# Patient Record
Sex: FEMALE | Race: WHITE
[De-identification: names, ages, dates, MRNs, and addresses within clinical notes are randomized per-mention and may not be internally consistent; named-entity substitution may affect disease eponyms.]

---

## 2018-04-15 ENCOUNTER — HOSPITAL ENCOUNTER (EMERGENCY)
Dept: HOSPITAL 41 - JD.ED | Age: 23
Discharge: HOME | End: 2018-04-15
Payer: COMMERCIAL

## 2018-04-15 DIAGNOSIS — Z79.899: ICD-10-CM

## 2018-04-15 DIAGNOSIS — F17.210: ICD-10-CM

## 2018-04-15 DIAGNOSIS — G43.909: Primary | ICD-10-CM

## 2018-04-15 PROCEDURE — 96361 HYDRATE IV INFUSION ADD-ON: CPT

## 2018-04-15 PROCEDURE — 36415 COLL VENOUS BLD VENIPUNCTURE: CPT

## 2018-04-15 PROCEDURE — 84703 CHORIONIC GONADOTROPIN ASSAY: CPT

## 2018-04-15 PROCEDURE — 99284 EMERGENCY DEPT VISIT MOD MDM: CPT

## 2018-04-15 PROCEDURE — 96375 TX/PRO/DX INJ NEW DRUG ADDON: CPT

## 2018-04-15 PROCEDURE — 96374 THER/PROPH/DIAG INJ IV PUSH: CPT

## 2018-04-15 NOTE — EDM.PDOC
ED HPI GENERAL MEDICAL PROBLEM





- General


Chief Complaint: Headache


Stated Complaint: HEADACHE


Time Seen by Provider: 04/15/18 18:57


Source of Information: Reports: Patient


History Limitations: Reports: No Limitations





- History of Present Illness


INITIAL COMMENTS - FREE TEXT/NARRATIVE: 





22-year-old female presents for evaluation and treatment of migraine headache. 

Patient reports headache started about 48 hours ago. States is located in the 

posterior parietal/occipital area. She has tried caffeine, Imitrex, Excedrin 

Migraine and ibuprofen without any symptom relief. She has a history of 

migraine headaches and states this feels similar to her previous migraines. 

Reports associated symptoms of nausea, vomiting, decreased appetite, blurry 

vision, photophobia or phonophobia. No fevers, cough or cold symptoms, double 

vision or numbness or tingling in extremities.





Patient denies any recent head trauma.


  ** Headache


Pain Score (Numeric/FACES): 9





- Related Data


 Allergies











Allergy/AdvReac Type Severity Reaction Status Date / Time


 


No Known Allergies Allergy   Verified 04/15/18 18:31











Home Meds: 


 Home Meds





Birth Control 1 tab PO DAILY 04/15/18 [History]


SUMAtriptan [Imitrex] 50 mg PO ASDIRECTED PRN 04/15/18 [History]


busPIRone [Buspar] 10 mg PO DAILY 04/15/18 [History]











Past Medical History


HEENT History: Reports: Impaired Vision





- Past Surgical History


HEENT Surgical History: Reports: Oral Surgery





Social & Family History





- Tobacco Use


Smoking Status *Q: Current Every Day Smoker


Years of Tobacco use: 7


Packs/Tins Daily: 0.5





- Caffeine Use


Caffeine Use: Reports: Soda





- Recreational Drug Use


Recreational Drug Use: No





ED ROS GENERAL





- Review of Systems


Review Of Systems: See Below


Constitutional: Reports: Decreased Appetite.  Denies: Fever


HEENT: Reports: Vision Change (blurry vision), Other (reports photophobia and 

phonophobia)


Respiratory: Denies: Cough


GI/Abdominal: Reports: Nausea, Vomiting


Neurological: Reports: Headache.  Denies: Numbness, Tingling





- Physical Exam


Exam: See Below


Exam Limited By: No Limitations


General Appearance: Alert, WD/WN, No Apparent Distress


Eye Exam: Bilateral Eye: Normal Inspection, PERRL


Ears: Normal External Exam, Normal TMs


Nose: Normal Inspection


Throat/Mouth: Normal Inspection, Normal Lips, Normal Oropharynx, Normal Voice, 

No Airway Compromise


Head Exam: Atraumatic, Normocephalic


Neck: Normal Inspection, Supple, Non-Tender, Full Range of Motion


Respiratory/Chest: No Respiratory Distress, Lungs Clear, Normal Breath Sounds


Cardiovascular: Normal Peripheral Pulses, Regular Rate, Rhythm, No Murmur


Neuro Exam (Abbreviated): Alert, Oriented, Normal Cognition, Normal Gait


Psychiatric: Normal Affect, Normal Mood


Skin Exam: Warm, Dry, Normal Color





Course





- Vital Signs


Last Recorded V/S: 


 Last Vital Signs











Temp  36.9 C   04/15/18 18:34


 


Pulse  91   04/15/18 18:34


 


Resp  15   04/15/18 18:34


 


BP  121/80   04/15/18 18:34


 


Pulse Ox  100   04/15/18 18:34














- Orders/Labs/Meds


Orders: 


 Active Orders 24 hr











 Category Date Time Status


 


 Peripheral IV Care [RC] .AS DIRECTED Care  04/15/18 19:12 Active


 


 Peripheral IV Insertion Adult [OM.PC] Routine Oth  04/15/18 19:11 Ordered











Labs: 


 Laboratory Tests











  04/15/18 Range/Units





  19:20 


 


HCG, Qual  Negative  (NEGATIVE)  











Meds: 


Medications














Discontinued Medications














Generic Name Dose Route Start Last Admin





  Trade Name Freq  PRN Reason Stop Dose Admin


 


Diphenhydramine HCl  50 mg  04/15/18 19:11  04/15/18 19:28





  Benadryl  IVPUSH  04/15/18 19:12  50 mg





  ONETIME ONE   Administration





     





     





     





     


 


Sodium Chloride  1,000 mls @ 999 mls/hr  04/15/18 19:11  04/15/18 19:24





  Normal Saline  IV  04/15/18 20:11  999 mls/hr





  ONETIME ONE   Administration





     





     





     





     


 


Ketorolac Tromethamine  30 mg  04/15/18 19:11  04/15/18 19:25





  Toradol  IVPUSH  04/15/18 19:12  30 mg





  ONETIME ONE   Administration





     





     





     





     


 


Ondansetron HCl  4 mg  04/15/18 19:11  04/15/18 19:24





  Zofran  IVPUSH  04/15/18 19:12  4 mg





  ONETIME ONE   Administration





     





     





     





     


 


Sodium Chloride  10 ml  04/15/18 19:11  04/15/18 19:29





  Saline Flush  FLUSH   10 ml





  ASDIRECTED PRN   Administration





  Keep Vein Open   





     





     





     














- Re-Assessments/Exams


Free Text/Narrative Re-Assessment/Exam: 





04/15/18 20:50


Patient reports migraine is down to 2 out of 10. She feels comfortable with 

this level and is open to going home at this time. 





Discharge instructions as documented








Departure





- Departure


Time of Disposition: 20:50


Disposition: Home, Self-Care 01


Condition: Good


Clinical Impression: 


 Migraine








- Discharge Information


Instructions:  Migraine Headache


Referrals: 


PCP,None [Primary Care Provider] - 


Forms:  ED Department Discharge, ED Return to Work/School Form


Additional Instructions: 


Go home and rest in a dark quiet room.





make sure you are drinking plenty of fluids.





Over-the-counter Tylenol or Motrin as for headaches. Continue with your current 

medications as needed for migraines.





Follow-up with your primary care provider if your Symptoms persist for further 

migraine management.





Please return to the ER if your symptoms change or worsen.





- My Orders


Last 24 Hours: 


My Active Orders





04/15/18 19:11


Peripheral IV Insertion Adult [OM.PC] Routine 





04/15/18 19:12


Peripheral IV Care [RC] .AS DIRECTED 














- Assessment/Plan


Last 24 Hours: 


My Active Orders





04/15/18 19:11


Peripheral IV Insertion Adult [OM.PC] Routine 





04/15/18 19:12


Peripheral IV Care [RC] .AS DIRECTED

## 2018-05-17 ENCOUNTER — HOSPITAL ENCOUNTER (EMERGENCY)
Age: 23
Discharge: HOME | End: 2018-05-17
Payer: COMMERCIAL

## 2018-05-17 VITALS
SYSTOLIC BLOOD PRESSURE: 124 MMHG | DIASTOLIC BLOOD PRESSURE: 78 MMHG | BODY MASS INDEX: 20.66 KG/M2 | HEIGHT: 65 IN | WEIGHT: 124 LBS | TEMPERATURE: 99.1 F | OXYGEN SATURATION: 99 % | RESPIRATION RATE: 16 BRPM | HEART RATE: 79 BPM

## 2018-05-17 DIAGNOSIS — L29.9 PRURITUS: ICD-10-CM

## 2018-05-17 DIAGNOSIS — R21 RASH AND OTHER NONSPECIFIC SKIN ERUPTION: Primary | ICD-10-CM

## 2018-05-17 PROCEDURE — 99202 OFFICE O/P NEW SF 15 MIN: CPT

## 2018-05-17 PROCEDURE — 99213 OFFICE O/P EST LOW 20 MIN: CPT | Performed by: NURSE PRACTITIONER

## 2018-05-17 RX ORDER — PREDNISONE 10 MG/1
10 TABLET ORAL DAILY
Qty: 10 TABLET | Refills: 0 | Status: SHIPPED | OUTPATIENT
Start: 2018-05-17 | End: 2018-05-27

## 2018-05-17 ASSESSMENT — ENCOUNTER SYMPTOMS
EYE DISCHARGE: 0
EYE ITCHING: 0
SORE THROAT: 0
SHORTNESS OF BREATH: 0
CHEST TIGHTNESS: 0
EYE PAIN: 0
EYE REDNESS: 0
NAUSEA: 0
COUGH: 0